# Patient Record
Sex: MALE | Race: BLACK OR AFRICAN AMERICAN | URBAN - METROPOLITAN AREA
[De-identification: names, ages, dates, MRNs, and addresses within clinical notes are randomized per-mention and may not be internally consistent; named-entity substitution may affect disease eponyms.]

---

## 2017-03-22 ENCOUNTER — OFFICE VISIT (OUTPATIENT)
Dept: FAMILY MEDICINE | Facility: CLINIC | Age: 62
End: 2017-03-22

## 2017-03-22 VITALS
RESPIRATION RATE: 18 BRPM | WEIGHT: 163 LBS | SYSTOLIC BLOOD PRESSURE: 203 MMHG | DIASTOLIC BLOOD PRESSURE: 107 MMHG | OXYGEN SATURATION: 97 % | HEART RATE: 69 BPM | TEMPERATURE: 97.4 F

## 2017-03-22 DIAGNOSIS — J45.31 MILD PERSISTENT ASTHMA WITH ACUTE EXACERBATION: ICD-10-CM

## 2017-03-22 DIAGNOSIS — I16.0 HYPERTENSIVE URGENCY: ICD-10-CM

## 2017-03-22 DIAGNOSIS — J43.8 OTHER EMPHYSEMA (H): Primary | ICD-10-CM

## 2017-03-22 LAB
ALBUMIN SERPL-MCNC: 4.5 MG/DL (ref 3.5–4.7)
ALP SERPL-CCNC: 65 U/L (ref 31.7–110.7)
ALT SERPL-CCNC: 16.1 U/L (ref 0–45)
AST SERPL-CCNC: 19.6 U/L (ref 0–55)
BILIRUB SERPL-MCNC: 0.5 MG/DL (ref 0.2–1.3)
BILIRUBIN DIRECT: 0.2 MG/DL (ref 0.1–0.3)
BUN SERPL-MCNC: 19.1 MG/DL (ref 7–21)
CALCIUM SERPL-MCNC: 9.9 MG/DL (ref 8.5–10.1)
CHLORIDE SERPLBLD-SCNC: 104.1 MMOL/L (ref 98–110)
CHOLEST SERPL-MCNC: 277.2 MG/DL (ref 0–200)
CHOLEST/HDLC SERPL: 5.8 {RATIO} (ref 0–5)
CO2 SERPL-SCNC: 27.8 MMOL/L (ref 20–32)
CREAT SERPL-MCNC: 0.9 MG/DL (ref 0.7–1.3)
GFR SERPL CREATININE-BSD FRML MDRD: >90 ML/MIN/1.7 M2
GLUCOSE SERPL-MCNC: 106.3 MG'DL (ref 70–99)
HBA1C MFR BLD: 5.3 % (ref 4.1–5.7)
HDLC SERPL-MCNC: 48 MG/DL
LDLC SERPL CALC-MCNC: 210 MG/DL (ref 0–129)
POTASSIUM SERPL-SCNC: 4.7 MMOL/DL (ref 3.3–4.5)
PROT SERPL-MCNC: 7 G/DL (ref 6.8–8.8)
SODIUM SERPL-SCNC: 139.9 MMOL/L (ref 132.6–141.4)
TRIGL SERPL-MCNC: 98.2 MG/DL (ref 0–150)
VLDL CHOLESTEROL: 19.6 MG/DL (ref 7–32)

## 2017-03-22 RX ORDER — TIOTROPIUM BROMIDE 18 UG/1
CAPSULE ORAL; RESPIRATORY (INHALATION)
Qty: 30 CAPSULE | Refills: 1 | Status: SHIPPED | OUTPATIENT
Start: 2017-03-22

## 2017-03-22 RX ORDER — ALBUTEROL SULFATE 90 UG/1
2 AEROSOL, METERED RESPIRATORY (INHALATION) EVERY 6 HOURS PRN
Qty: 1 INHALER | Refills: 3 | Status: SHIPPED | OUTPATIENT
Start: 2017-03-22 | End: 2017-03-22

## 2017-03-22 RX ORDER — AMLODIPINE BESYLATE 5 MG/1
5 TABLET ORAL DAILY
Qty: 30 TABLET | Refills: 1 | Status: SHIPPED | OUTPATIENT
Start: 2017-03-22 | End: 2017-03-30

## 2017-03-22 RX ORDER — ALBUTEROL SULFATE 90 UG/1
2 AEROSOL, METERED RESPIRATORY (INHALATION) EVERY 6 HOURS PRN
Qty: 1 INHALER | Refills: 3 | Status: SHIPPED | OUTPATIENT
Start: 2017-03-22

## 2017-03-22 RX ORDER — CHLORTHALIDONE 25 MG/1
25 TABLET ORAL DAILY
Qty: 90 TABLET | Refills: 3 | Status: SHIPPED | OUTPATIENT
Start: 2017-03-22 | End: 2017-03-30

## 2017-03-22 RX ORDER — TIOTROPIUM BROMIDE 18 UG/1
CAPSULE ORAL; RESPIRATORY (INHALATION)
Qty: 30 CAPSULE | Refills: 1 | Status: SHIPPED | OUTPATIENT
Start: 2017-03-22 | End: 2017-03-22

## 2017-03-22 NOTE — PROGRESS NOTES
HPI       Dana Meehan is a 62 year old  male  who presents to establish care as well as bp control. He states that he has been has been a smoker for 35 years. He currently works as a . No fevers or chills. No PURI. No SOB no orthopnea.  He denies chest pain. He is currently not on albuterol but says he has a history of asthma.     There are no active problems to display for this patient.      No current outpatient prescriptions on file.        No Known Allergies    Problem, Medication and Allergy Lists were reviewed and updated if needed..    Patient is an established patient of this clinic..         Review of Systems:   General: No distress  HEENT: No sore throat  Pulm: No shortness of breath, no cough  CVS: No chest pain, no palpitations  GI: No abdominal pain, nausea, vomiting or diarrhea   : No dysuria  MSK: No back pain  Skin: No new rashes  Neuro: No headache, no dizziness,               Physical Exam:     Vitals:    03/22/17 0859   BP: (!) 203/107   BP Location: Left arm   Patient Position: Chair   Cuff Size: Adult Regular   Pulse: 69   Resp: 18   Temp: 97.4  F (36.3  C)   TempSrc: Oral   SpO2: 97%   Weight: 163 lb (73.9 kg)     There is no height or weight on file to calculate BMI.    Constitutional: Awake, alert, cooperative, no apparent distress, and appears stated age  HEENT: MMM, no conjunctival pallor  Pulm: CTAB, No rales, No wheeze, no increased effort of breathing.  CVS: RRR, No murmurs   GI: S NT ND BS +ve  Hem/Onc: No cervical LN marisel  MSK: No pedal edema or calf tenderness.  Skin : no  rash  Neuro:  No focal neurological deficit  Psych : Good eye contact, well groomed, very interactive and collaborative. Good insight. Thought process is linear and coherent. Associations are tight. Mood is good. Affect euthymic.       EKG Interpretation:  By Jacquelyn Watson MD    Indication:Hypertension  Symptoms at time of EKG: None   Interpretation: appears normal, NSR, normal axis, normal  intervals, no acute ST/T changes c/w ischemia, no LVH by voltage criteria, unchanged from previous tracings  Comparison with previous EKGs:No change from previous    Patient informed at visit.    No results found for this or any previous visit (from the past 24 hour(s)).    Assessment and Plan      1. Hypertensive urgency- labs to rule out end organ failure. EKG without St/t changes. Has stopped bp medication for over 1 week. He does not know what medication he was on and has no idea his bp readings while on it. Will start on two meds today. Follow up within 1 week for BP check and then 2 weeks to check labs.     - Hepatic Panel ([facility])  - Basic Metabolic Panel ([facility])  - EKG 12-lead complete w/read - Clinics  - Lipid [place] ([facility])  - Hemoglobin A1c ([facility])  - amLODIPine (NORVASC) 5 MG tablet; Take 1 tablet (5 mg) by mouth daily  Dispense: 30 tablet; Refill: 1  - chlorthalidone (HYGROTON) 25 MG tablet; Take 1 tablet (25 mg) by mouth daily  Dispense: 90 tablet; Refill: 3    2. Other emphysema. - per patient he has hx of asthma.  no pfts in chart. Patient not interested in getting any done due to schedule. Will start on Spiriva and albuterol prn for now.   - albuterol (PROAIR HFA/PROVENTIL HFA/VENTOLIN HFA) 108 (90 BASE) MCG/ACT Inhaler; Inhale 2 puffs into the lungs every 6 hours as needed for shortness of breath / dyspnea or wheezing  Dispense: 1 Inhaler; Refill: 3  - tiotropium (SPIRIVA HANDIHALER) 18 MCG capsule; Inhale contents of one capsule daily.  Dispense: 30 capsule; Refill: 1    There are no discontinued medications.    Options for treatment and follow-up care were reviewed with the patient. Dana Meehan  engaged in the decision making process and verbalized understanding of the options discussed and agreed with the final plan.    Jacquelyn Watson MD G3  [facility]  Family Medicine Resident  Pager [phone]              .

## 2017-03-22 NOTE — PROGRESS NOTES
Preceptor Attestation:   Patient seen and discussed with the resident. I personally viewed the EKG and agree with the interpretation documented by the resident. Assessment and plan reviewed with resident and agreed upon.   Supervising Physician:  Tammy Engle MD  [facility]

## 2017-03-22 NOTE — MR AVS SNAPSHOT
After Visit Summary   3/22/2017    Dana Meehan    MRN: [id number]           Patient Information     Date Of Birth          1955        Visit Information        Provider Department      3/22/2017 8:40 AM Jacquelyn Watson MD [facility]        Today's Diagnoses     Hypertensive urgency    -  1    Mild persistent asthma with acute exacerbation        Other emphysema (H)          Care Instructions    Here is the plan from today's visit    1. Hypertensive urgency    - Hepatic Panel ([facility])  - Basic Metabolic Panel ([facility])  - EKG 12-lead complete w/read - Clinics  - Lipid [place] ([facility])  - Hemoglobin A1c ([facility])  - Albumin Random Urine Quantitative  - amLODIPine (NORVASC) 5 MG tablet; Take 1 tablet (5 mg) by mouth daily  Dispense: 30 tablet; Refill: 1  - chlorthalidone (HYGROTON) 25 MG tablet; Take 1 tablet (25 mg) by mouth daily  Dispense: 90 tablet; Refill: 3    2. Mild persistent asthma with acute exacerbation    - albuterol (PROAIR HFA/PROVENTIL HFA/VENTOLIN HFA) 108 (90 BASE) MCG/ACT Inhaler; Inhale 2 puffs into the lungs every 6 hours as needed for shortness of breath / dyspnea or wheezing  Dispense: 1 Inhaler; Refill: 3    3. Other emphysema (H)    - tiotropium (SPIRIVA HANDIHALER) 18 MCG capsule; Inhale contents of one capsule daily.  Dispense: 30 capsule; Refill: 1      Follow up in 2-3 days , Friday or Monday  Check bp in pharmacies, if does not go down less than 200 , go to hospital    Thank you for coming to [facility] today.  Lab Testing:  **If you had lab testing today and your results are reassuring or normal they will be mailed to you or sent through [place] within 7 days.   **If the lab tests need quick action we will call you with the results.  The phone number we will call with results is # [phone] (home) . If this is not the best number please call our clinic and change the number.  Medication Refills:  If you need any refills please call  your pharmacy and they will contact us.   If you need to  your refill at a new pharmacy, please contact the new pharmacy directly. The new pharmacy will help you get your medications transferred faster.   Scheduling:  If you have any concerns about today's visit or wish to schedule another appointment please call our office during normal business hours [phone] (8-5:00 M-F)  If a referral was made to a [facility] and you don't get a call from central scheduling please call [phone].  If a Mammogram was ordered for you at [facility] call [phone] to schedule or change your appointment.  If you had an XRay/CT/Ultrasound/MRI ordered the number is [phone] to schedule or change your radiology appointment.   Medical Concerns:  If you have urgent medical concerns please call [phone] at any time of the day.          Follow-ups after your visit        Who to contact     Please call your clinic at [phone] to:    Ask questions about your health    Make or cancel appointments    Discuss your medicines    Learn about your test results    Speak to your doctor   If you have compliments or concerns about an experience at your clinic, or if you wish to file a complaint, please contact [facility] Patient Relations at [phone] or email us at [email or web address]         Additional Information About Your Visit        MyChart Information     [employer] is an electronic gateway that provides easy, online access to your medical records. With [employer], you can request a clinic appointment, read your test results, renew a prescription or communicate with your care team.     To sign up for [employer] visit the website at [email or web address]   You will be asked to enter the access code listed below, as well as some personal information. Please follow the directions to create your username and password.     Your access code is:  [email or web address]  Expires: 2017 10:16 AM     Your access code will  in 90 days. If you need help or a new code, please contact your [facility] or call [phone] for assistance.        Care EveryWhere ID     This is your Care EveryWhere ID. This could be used by other organizations to access your [facility] medical records  [id number]        Your Vitals Were     Pulse Temperature Respirations Pulse Oximetry          69 97.4  F (36.3  C) (Oral) 18 97%         Blood Pressure from Last 3 Encounters:   17 (!) 203/107    Weight from Last 3 Encounters:   17 163 lb (73.9 kg)              We Performed the Following     Albumin Random Urine Quantitative     Basic Metabolic Panel ([facility])     EKG 12-lead complete w/read - Clinics     Hemoglobin A1c ([facility])     Hepatic Panel ([facility])     Lipid [place] ([facility])          Today's Medication Changes          These changes are accurate as of: 3/22/17 10:16 AM.  If you have any questions, ask your nurse or doctor.               Start taking these medicines.        Dose/Directions    albuterol 108 (90 BASE) MCG/ACT Inhaler   Commonly known as:  PROAIR HFA/PROVENTIL HFA/VENTOLIN HFA   Used for:  Mild persistent asthma with acute exacerbation   Started by:  Jacquelyn Watson MD        Dose:  2 puff   Inhale 2 puffs into the lungs every 6 hours as needed for shortness of breath / dyspnea or wheezing   Quantity:  1 Inhaler   Refills:  3       amLODIPine 5 MG tablet   Commonly known as:  NORVASC   Used for:  Hypertensive urgency   Started by:  Jacquelyn Watson MD        Dose:  5 mg   Take 1 tablet (5 mg) by mouth daily   Quantity:  30 tablet   Refills:  1       chlorthalidone 25 MG tablet   Commonly known as:  HYGROTON   Used for:  Hypertensive urgency   Started by:  Jacquelyn Watson MD        Dose:  25 mg   Take 1 tablet (25 mg) by mouth daily   Quantity:  90 tablet   Refills:  3       tiotropium 18 MCG capsule   Commonly  known as:  SPIRIVA HANDIHALER   Used for:  Other emphysema (H)   Started by:  Jacquelyn Watson MD        Inhale contents of one capsule daily.   Quantity:  30 capsule   Refills:  1            Where to get your medicines      These medications were sent to [facility] [place], [place] - [address] [facility] [address]     Phone:  [phone]     albuterol 108 (90 BASE) MCG/ACT Inhaler    amLODIPine 5 MG tablet    chlorthalidone 25 MG tablet    tiotropium 18 MCG capsule                Primary Care Provider Office Phone # Fax #    Jacquelyn Watson [phone]       [facility] [address]  [facility] [address]        Thank you!     Thank you for choosing [facility]  for your care. Our goal is always to provide you with excellent care. Hearing back from our patients is one way we can continue to improve our services. Please take a few minutes to complete the written survey that you may receive in the mail after your visit with us. Thank you!             Your Updated Medication List - Protect others around you: Learn how to safely use, store and throw away your medicines at www.disposemymeds.org.          This list is accurate as of: 3/22/17 10:16 AM.  Always use your most recent med list.                   Brand Name Dispense Instructions for use    albuterol 108 (90 BASE) MCG/ACT Inhaler    PROAIR HFA/PROVENTIL HFA/VENTOLIN HFA    1 Inhaler    Inhale 2 puffs into the lungs every 6 hours as needed for shortness of breath / dyspnea or wheezing       amLODIPine 5 MG tablet    NORVASC    30 tablet    Take 1 tablet (5 mg) by mouth daily       chlorthalidone 25 MG tablet    HYGROTON    90 tablet    Take 1 tablet (25 mg) by mouth daily       tiotropium 18 MCG capsule    SPIRIVA HANDIHALER    30 capsule    Inhale contents of one capsule daily.

## 2017-03-22 NOTE — PATIENT INSTRUCTIONS
Here is the plan from today's visit    1. Hypertensive urgency    - Hepatic Panel ([facility])  - Basic Metabolic Panel ([facility])  - EKG 12-lead complete w/read - Clinics  - Lipid [place] ([facility])  - Hemoglobin A1c ([facility])  - Albumin Random Urine Quantitative  - amLODIPine (NORVASC) 5 MG tablet; Take 1 tablet (5 mg) by mouth daily  Dispense: 30 tablet; Refill: 1  - chlorthalidone (HYGROTON) 25 MG tablet; Take 1 tablet (25 mg) by mouth daily  Dispense: 90 tablet; Refill: 3    2. Mild persistent asthma with acute exacerbation    - albuterol (PROAIR HFA/PROVENTIL HFA/VENTOLIN HFA) 108 (90 BASE) MCG/ACT Inhaler; Inhale 2 puffs into the lungs every 6 hours as needed for shortness of breath / dyspnea or wheezing  Dispense: 1 Inhaler; Refill: 3    3. Other emphysema (H)    - tiotropium (SPIRIVA HANDIHALER) 18 MCG capsule; Inhale contents of one capsule daily.  Dispense: 30 capsule; Refill: 1      Follow up in 2-3 days , Friday or Monday  Check bp in pharmacies, if does not go down less than 200 , go to hospital    Thank you for coming to [facility] today.  Lab Testing:  **If you had lab testing today and your results are reassuring or normal they will be mailed to you or sent through [employer] within 7 days.   **If the lab tests need quick action we will call you with the results.  The phone number we will call with results is # [phone] (home) . If this is not the best number please call our clinic and change the number.  Medication Refills:  If you need any refills please call your pharmacy and they will contact us.   If you need to  your refill at a new pharmacy, please contact the new pharmacy directly. The new pharmacy will help you get your medications transferred faster.   Scheduling:  If you have any concerns about today's visit or wish to schedule another appointment please call our office during normal business hours [phone] (8-5:00 M-F)  If a referral was made to a [facility] and you don't get a call from central scheduling please call [phone].  If a Mammogram was ordered for you at [facility] call [phone] to schedule or change your appointment.  If you had an XRay/CT/Ultrasound/MRI ordered the number is [phone] to schedule or change your radiology appointment.   Medical Concerns:  If you have urgent medical concerns please call [phone] at any time of the day.

## 2017-03-22 NOTE — Clinical Note
3/22/2017      RE: Dana Meehan  [address] [address]              HPI       Dana Meehan is a 62 year old male who presents to establish care as well as bp control. He states that he has been has been a smoker for 35 years. He currently works as a . No fevers or chills. No PURI. No SOB no orthopnea. He denies chest pain. He is currently not on albuterol but says he has a history of asthma.         No Known Allergies     Problem, Medication and Allergy Lists were reviewed and updated if needed..     Patient is an established patient of this clinic..         Review of Systems:    General: No distress  HEENT: No sore throat  Pulm: No shortness of breath, no cough  CVS: No chest pain, no palpitations  GI: No abdominal pain, nausea, vomiting or diarrhea   : No dysuria  MSK: No back pain  Skin: No new rashes  Neuro: No headache, no dizziness,        Physical Exam:       Vitals        Vitals:     03/22/17 0859   BP: (!) 203/107   BP Location: Left arm   Patient Position: Chair   Cuff Size: Adult Regular   Pulse: 69   Resp: 18   Temp: 97.4  F (36.3  C)   TempSrc: Oral   SpO2: 97%   Weight: 163 lb (73.9 kg)         There is no height or weight on file to calculate BMI.     Constitutional: Awake, alert, cooperative, no apparent distress, and appears stated age  HEENT: MMM, no conjunctival pallor  Pulm: CTAB, No rales, No wheeze, no increased effort of breathing.  CVS: RRR, No murmurs   GI: S NT ND BS +ve  Hem/Onc: No cervical LN marisel  MSK: No pedal edema or calf tenderness.  Skin : no rash  Neuro: No focal neurological deficit  Psych : Good eye contact, well groomed, very interactive and collaborative. Good insight. Thought process is linear and coherent. Associations are tight. Mood is good. Affect euthymic.         EKG Interpretation:  By Jacquelyn Watson MD     Indication:Hypertension  Symptoms at time of EKG: None   Interpretation: appears normal, NSR, normal axis, normal intervals,  no acute ST/T changes c/w ischemia, no LVH by voltage criteria, unchanged from previous tracings  Comparison with previous EKGs:No change from previous     Patient informed at visit.     No results found for this or any previous visit (from the past 24 hour(s)).     Assessment and Plan       1. Hypertensive urgency- labs to rule out end organ failure. EKG without St/t changes. Has stopped bp medication for over 1 week. He does not know what medication he was on and has no idea his bp readings while on it. Will start on two meds today. Follow up within 1 week for BP check and then 2 weeks to check labs.      - Hepatic Panel ([facility])  - Basic Metabolic Panel ([facility])  - EKG 12-lead complete w/read - Clinics  - Lipid [place] ([facility])  - Hemoglobin A1c ([facility])  - amLODIPine ([facility]ASC) 5 MG tablet; Take 1 tablet (5 mg) by mouth daily Dispense: 30 tablet; Refill: 1  - chlorthalidone (HYGROTON) 25 MG tablet; Take 1 tablet (25 mg) by mouth daily Dispense: 90 tablet; Refill: 3     2. Other emphysema. - per patient he has hx of asthma.  no pfts in chart. Patient not interested in getting any done due to schedule. Will start on Spiriva and albuterol prn for now.   - albuterol (PROAIR HFA/PROVENTIL HFA/VENTOLIN HFA) 108 (90 BASE) MCG/ACT Inhaler; Inhale 2 puffs into the lungs every 6 hours as needed for shortness of breath / dyspnea or wheezing Dispense: 1 Inhaler; Refill: 3  - tiotropium (SPIRIVA HANDIHALER) 18 MCG capsule; Inhale contents of one capsule daily. Dispense: 30 capsule; Refill: 1     There are no discontinued medications.     Options for treatment and follow-up care were reviewed with the patient. Dana Meehan engaged in the decision making process and verbalized understanding of the options discussed and agreed with the final plan.     Jacquelyn Watson MD G3  [facility]  Family Medicine Resident  Pager [phone]                   HPI       aDna Meehan is a 62 year old male  who presents to establish care as well as bp control. He states that he has been has been a smoker for 35 years. He currently works as a . No fevers or chills. No PURI. No SOB no orthopnea. He denies chest pain. He is currently not on albuterol but says he has a history of asthma.         No Known Allergies     Problem, Medication and Allergy Lists were reviewed and updated if needed..     Patient is an established patient of this clinic..         Review of Systems:    General: No distress  HEENT: No sore throat  Pulm: No shortness of breath, no cough  CVS: No chest pain, no palpitations  GI: No abdominal pain, nausea, vomiting or diarrhea   : No dysuria  MSK: No back pain  Skin: No new rashes  Neuro: No headache, no dizziness,        Physical Exam:       Vitals        Vitals:     03/22/17 0859   BP: (!) 203/107   BP Location: Left arm   Patient Position: Chair   Cuff Size: Adult Regular   Pulse: 69   Resp: 18   Temp: 97.4  F (36.3  C)   TempSrc: Oral   SpO2: 97%   Weight: 163 lb (73.9 kg)         There is no height or weight on file to calculate BMI.     Constitutional: Awake, alert, cooperative, no apparent distress, and appears stated age  HEENT: MMM, no conjunctival pallor  Pulm: CTAB, No rales, No wheeze, no increased effort of breathing.  CVS: RRR, No murmurs   GI: S NT ND BS +ve  Hem/Onc: No cervical LN marsiel  MSK: No pedal edema or calf tenderness.  Skin : no rash  Neuro: No focal neurological deficit  Psych : Good eye contact, well groomed, very interactive and collaborative. Good insight. Thought process is linear and coherent. Associations are tight. Mood is good. Affect euthymic.         EKG Interpretation:  By Jacquelyn Watson MD     Indication:Hypertension  Symptoms at time of EKG: None   Interpretation: appears normal, NSR, normal axis, normal intervals, no acute ST/T changes c/w ischemia, no LVH by voltage criteria, unchanged from previous tracings  Comparison with previous EKGs:No  change from previous     Patient informed at visit.     No results found for this or any previous visit (from the past 24 hour(s)).     Assessment and Plan       1. Hypertensive urgency- labs to rule out end organ failure. EKG without St/t changes. Has stopped bp medication for over 1 week. He does not know what medication he was on and has no idea his bp readings while on it. Will start on two meds today. Follow up within 1 week for BP check and then 2 weeks to check labs.      - Hepatic Panel ([facility])  - Basic Metabolic Panel ([facility])  - EKG 12-lead complete w/read - Clinics  - Lipid [place] ([facility])  - Hemoglobin A1c ([facility])  - amLODIPine (NORVASC) 5 MG tablet; Take 1 tablet (5 mg) by mouth daily Dispense: 30 tablet; Refill: 1  - chlorthalidone (HYGROTON) 25 MG tablet; Take 1 tablet (25 mg) by mouth daily Dispense: 90 tablet; Refill: 3     2. Other emphysema. - per patient he has hx of asthma.  no pfts in chart. Patient not interested in getting any done due to schedule. Will start on Spiriva and albuterol prn for now.   - albuterol (PROAIR HFA/PROVENTIL HFA/VENTOLIN HFA) 108 (90 BASE) MCG/ACT Inhaler; Inhale 2 puffs into the lungs every 6 hours as needed for shortness of breath / dyspnea or wheezing Dispense: 1 Inhaler; Refill: 3  - tiotropium (SPIRIVA HANDIHALER) 18 MCG capsule; Inhale contents of one capsule daily. Dispense: 30 capsule; Refill: 1     There are no discontinued medications.     Options for treatment and follow-up care were reviewed with the patient. Dana Meehan engaged in the decision making process and verbalized understanding of the options discussed and agreed with the final plan.     Jacquelyn Watson MD G3  [facility]  Family Medicine Resident  Pager [phone]            Preceptor Attestation:   Patient seen and discussed with the resident. Assessment and plan reviewed with resident and agreed upon.   Supervising Physician:  Tammy Engle  MD [facility]      Jacquelyn Watson MD

## 2017-03-22 NOTE — Clinical Note
Siomara - I can't close this encounter and can't figure out why it says Jacquelyn has an incomplete note?  Tammy

## 2017-03-23 ENCOUNTER — TELEPHONE (OUTPATIENT)
Dept: FAMILY MEDICINE | Facility: CLINIC | Age: 62
End: 2017-03-23

## 2017-03-23 DIAGNOSIS — J41.0 SIMPLE CHRONIC BRONCHITIS (H): Primary | ICD-10-CM

## 2017-03-23 NOTE — TELEPHONE ENCOUNTER
PLEASE RESEND NEW RX FOR INCRUSE ELLIPTA,     SPIRIVA IS NONFORMULARY. OTHER WISE SUBMIT PA USING INFORMATION BELOW    PRIOR AUTHORIZATION FOR   Drug: SPIRIVA  Insurance: [facility] [facility]  ID Number: [id number]  BIN Number: [id number]  PCN Number: [id number]  Group Number: [id number]  Phone Number: [phone]    Please notify pharmacy if Prior Auth is approved or denied      THANKS  Yoanna Cristina CPhT   [facility]   Phone [phone]

## 2017-03-24 ENCOUNTER — TELEPHONE (OUTPATIENT)
Dept: FAMILY MEDICINE | Facility: CLINIC | Age: 62
End: 2017-03-24

## 2017-03-24 NOTE — TELEPHONE ENCOUNTER
PA started 03/24/2017 tiotropium (SPIRIVA HANDIHALER) 18 MCG capsule    covermymeds crowell: [id number]    Ting Guzman CMA

## 2017-03-27 ENCOUNTER — OFFICE VISIT (OUTPATIENT)
Dept: OPHTHALMOLOGY | Facility: CLINIC | Age: 62
End: 2017-03-27
Attending: OPHTHALMOLOGY
Payer: COMMERCIAL

## 2017-03-27 DIAGNOSIS — H52.4 MYOPIA WITH ASTIGMATISM AND PRESBYOPIA, BILATERAL: Primary | ICD-10-CM

## 2017-03-27 DIAGNOSIS — H25.13 NUCLEAR SCLEROTIC CATARACT, BILATERAL: ICD-10-CM

## 2017-03-27 DIAGNOSIS — H52.13 MYOPIA WITH ASTIGMATISM AND PRESBYOPIA, BILATERAL: Primary | ICD-10-CM

## 2017-03-27 DIAGNOSIS — H52.203 MYOPIA WITH ASTIGMATISM AND PRESBYOPIA, BILATERAL: Primary | ICD-10-CM

## 2017-03-27 PROBLEM — I10 BENIGN ESSENTIAL HYPERTENSION: Status: ACTIVE | Noted: 2017-03-27

## 2017-03-27 PROBLEM — I16.0 HYPERTENSIVE URGENCY: Status: ACTIVE | Noted: 2017-03-27

## 2017-03-27 PROCEDURE — 99213 OFFICE O/P EST LOW 20 MIN: CPT | Mod: ZF

## 2017-03-27 PROCEDURE — 92015 DETERMINE REFRACTIVE STATE: CPT | Mod: ZF

## 2017-03-27 ASSESSMENT — REFRACTION_MANIFEST
OS_CYLINDER: +0.50
OS_AXIS: 090
OD_CYLINDER: +0.50
OD_AXIS: 045
OS_ADD: +2.50
OD_ADD: +2.50
OS_SPHERE: -0.20
OD_SPHERE: -0.75

## 2017-03-27 ASSESSMENT — CONF VISUAL FIELD
OS_NORMAL: 1
OD_NORMAL: 1

## 2017-03-27 ASSESSMENT — CUP TO DISC RATIO
OD_RATIO: 0.25
OS_RATIO: 0.25

## 2017-03-27 ASSESSMENT — VISUAL ACUITY
OD_SC+: -1
OS_SC+: +2
METHOD: SNELLEN - LINEAR
OD_SC: 20/20
OS_SC: 20/40

## 2017-03-27 ASSESSMENT — TONOMETRY
OD_IOP_MMHG: 14
OS_IOP_MMHG: 14
IOP_METHOD: TONOPEN

## 2017-03-27 ASSESSMENT — SLIT LAMP EXAM - LIDS
COMMENTS: NORMAL
COMMENTS: NORMAL

## 2017-03-27 NOTE — NURSING NOTE
"Chief Complaints and History of Present Illnesses   Patient presents with     COMPREHENSIVE EYE EXAM     Needs new glasses MRx.     HPI    Affected eye(s):  Both   Symptoms:     Dryness         Do you have eye pain now?:  No      Comments:  Pt states he feels a \"warmth\" in eyes.\"  Pt also states eyes feel fatigued after reading for a while.  Marti Patterson COA 2:07 PM March 27, 2017                "

## 2017-03-27 NOTE — MR AVS SNAPSHOT
After Visit Summary   3/27/2017    Dana Meehan    MRN: [id number]           Patient Information     Date Of Birth          1955        Visit Information        Provider Department      3/27/2017 1:30 PM Kevyn Retana MD; [facility] Eye Clinic        Today's Diagnoses     Myopia with astigmatism and presbyopia, bilateral    -  1    Nuclear sclerotic cataract, bilateral           Follow-ups after your visit        Follow-up notes from your care team     Return in about 1 year (around 3/27/2018) for complete eye exam .      Who to contact     Please call your clinic at [phone] to:    Ask questions about your health    Make or cancel appointments    Discuss your medicines    Learn about your test results    Speak to your doctor   If you have compliments or concerns about an experience at your clinic, or if you wish to file a complaint, please contact [facility] Patient Relations at [phone] or email us at [email or web address]         Additional Information About Your Visit        MyChart Information     microDimensionst gives you secure access to your electronic health record. If you see a primary care provider, you can also send messages to your care team and make appointments. If you have questions, please call your primary care clinic.  If you do not have a primary care provider, please call [phone] and they will assist you.      Praccel is an electronic gateway that provides easy, online access to your medical records. With Praccel, you can request a clinic appointment, read your test results, renew a prescription or communicate with your care team.     To access your existing account, please contact your [facility] or call [phone] for assistance.        Care EveryWhere ID     This is your Care EveryWhere ID. This could be used by other organizations to access your [facility]  records  [id number]         Blood Pressure from Last 3 Encounters:   03/30/17 174/89   03/22/17 (!) 203/107    Weight from Last 3 Encounters:   03/30/17 74.2 kg (163 lb 9.6 oz)   03/22/17 73.9 kg (163 lb)              Today, you had the following     No orders found for display       Primary Care Provider Office Phone # Fax #    Jacquelyn Watson [phone]       [facility] [address] [facility] [address]        Thank you!     Thank you for choosing EYE CLINIC  for your care. Our goal is always to provide you with excellent care. Hearing back from our patients is one way we can continue to improve our services. Please take a few minutes to complete the written survey that you may receive in the mail after your visit with us. Thank you!             Your Updated Medication List - Protect others around you: Learn how to safely use, store and throw away your medicines at www.disposemymeds.org.          This list is accurate as of: 3/27/17 11:59 PM.  Always use your most recent med list.                   Brand Name Dispense Instructions for use    albuterol 108 (90 BASE) MCG/ACT Inhaler    PROAIR HFA/PROVENTIL HFA/VENTOLIN HFA    1 Inhaler    Inhale 2 puffs into the lungs every 6 hours as needed for shortness of breath / dyspnea or wheezing       tiotropium 18 MCG capsule    SPIRIVA HANDIHALER    30 capsule    Inhale contents of one capsule daily.

## 2017-03-27 NOTE — PROGRESS NOTES
CC:  Patient presents with:  COMPREHENSIVE EYE EXAM: Needs new glasses MRx.      HPI:   Dana Meehan is a 62 year old year-old patient who presents for complete eye exam. He notes difficulty with reading and gets eye strain after a period of time. Would like new glasses rx today. Also notes vague sx of warmth in his eyes.     POH:   none    Current Ocular Meds:  None    ASSESSMENT & PLAN:    Dana Meehan is a 62 year old male with the following diagnoses:     1. Myopia with astigmatism and presbyopia OU   New glasses rx given     2. Cataracts OU   Mild, not visually significant   Observe    RTC: 1 year for annual exam     Kevyn Retana MD MPH   Ophthalmology Resident PGY-3      Teaching statement:  Complete documentation of historical and exam elements from today's encounter can be found in the full encounter summary report (not reduplicated in this progress note). I personally obtained the chief complaint(s) and history of present illness.  I confirmed and edited as necessary the review of systems, past medical/surgical history, family history, social history, and examination findings as documented by others; and I examined the patient myself. I personally reviewed the relevant tests, images, and reports as documented above.     I formulated and edited as necessary the assessment and plan and discussed the findings and management plan with the patient and family.    Brandy Coto MD  Comprehensive Ophthalmology & Ocular Pathology  Department of Ophthalmology and Visual Neurosciences  [email or web address]  Pager [phone]

## 2017-03-30 ENCOUNTER — OFFICE VISIT (OUTPATIENT)
Dept: FAMILY MEDICINE | Facility: CLINIC | Age: 62
End: 2017-03-30

## 2017-03-30 VITALS
SYSTOLIC BLOOD PRESSURE: 174 MMHG | DIASTOLIC BLOOD PRESSURE: 89 MMHG | OXYGEN SATURATION: 98 % | TEMPERATURE: 97.6 F | WEIGHT: 163.6 LBS | HEART RATE: 71 BPM | RESPIRATION RATE: 18 BRPM

## 2017-03-30 DIAGNOSIS — K04.7 TOOTH ABSCESS: ICD-10-CM

## 2017-03-30 DIAGNOSIS — Z91.89 AT RISK FOR HEART DISEASE: ICD-10-CM

## 2017-03-30 DIAGNOSIS — I10 BENIGN ESSENTIAL HYPERTENSION: Primary | ICD-10-CM

## 2017-03-30 DIAGNOSIS — J41.0 SIMPLE CHRONIC BRONCHITIS (H): ICD-10-CM

## 2017-03-30 DIAGNOSIS — I16.0 HYPERTENSIVE URGENCY: ICD-10-CM

## 2017-03-30 DIAGNOSIS — Z00.00 HEALTH CARE MAINTENANCE: ICD-10-CM

## 2017-03-30 LAB
BUN SERPL-MCNC: 13.3 MG/DL (ref 7–21)
CALCIUM SERPL-MCNC: 10.1 MG/DL (ref 8.5–10.1)
CHLORIDE SERPLBLD-SCNC: 99.4 MMOL/L (ref 98–110)
CO2 SERPL-SCNC: 34.7 MMOL/L (ref 20–32)
CREAT SERPL-MCNC: 0.8 MG/DL (ref 0.7–1.3)
GFR SERPL CREATININE-BSD FRML MDRD: >90 ML/MIN/1.7 M2
GLUCOSE SERPL-MCNC: 115.3 MG'DL (ref 70–99)
POTASSIUM SERPL-SCNC: 4.2 MMOL/DL (ref 3.3–4.5)
SODIUM SERPL-SCNC: 139.3 MMOL/L (ref 132.6–141.4)

## 2017-03-30 RX ORDER — CHLORTHALIDONE 25 MG/1
50 TABLET ORAL DAILY
Qty: 90 TABLET | Refills: 3 | Status: SHIPPED | OUTPATIENT
Start: 2017-03-30

## 2017-03-30 RX ORDER — AMLODIPINE BESYLATE 10 MG/1
10 TABLET ORAL DAILY
Qty: 60 TABLET | Refills: 3 | Status: SHIPPED | OUTPATIENT
Start: 2017-03-30

## 2017-03-30 RX ORDER — CHOLECALCIFEROL (VITAMIN D3) 50 MCG
2000 TABLET ORAL DAILY
Qty: 100 TABLET | Refills: 3 | Status: SHIPPED | OUTPATIENT
Start: 2017-03-30

## 2017-03-30 RX ORDER — ATORVASTATIN CALCIUM 40 MG/1
40 TABLET, FILM COATED ORAL DAILY
Qty: 90 TABLET | Refills: 3 | Status: SHIPPED | OUTPATIENT
Start: 2017-03-30

## 2017-03-30 NOTE — NURSING NOTE
name: Chanelle  Language: Jordanian  Agency: [employer]  Phone number: [phone]  Mayra Flores CMA

## 2017-03-30 NOTE — MR AVS SNAPSHOT
After Visit Summary   3/30/2017    Dana Meehan    MRN: [id number]           Patient Information     Date Of Birth          1955        Visit Information        Provider Department      3/30/2017 8:40 AM Jacquelyn Watson MD [facility]        Today's Diagnoses     Benign essential hypertension    -  1    Hypertensive urgency        At risk for heart disease        Health care maintenance        Tooth abscess        Simple chronic bronchitis (H)           Follow-ups after your visit        Who to contact     Please call your clinic at [phone] to:    Ask questions about your health    Make or cancel appointments    Discuss your medicines    Learn about your test results    Speak to your doctor   If you have compliments or concerns about an experience at your clinic, or if you wish to file a complaint, please contact [facility] Patient Relations at [phone] or email us at [email or web address]         Additional Information About Your Visit        MyChart Information     ki workt gives you secure access to your electronic health record. If you see a primary care provider, you can also send messages to your care team and make appointments. If you have questions, please call your primary care clinic.  If you do not have a primary care provider, please call [phone] and they will assist you.      Chegg is an electronic gateway that provides easy, online access to your medical records. With Chegg, you can request a clinic appointment, read your test results, renew a prescription or communicate with your care team.     To access your existing account, please contact your [facility] or call [phone] for assistance.        Care EveryWhere ID     This is your Care EveryWhere ID. This could be used by other organizations to access your [place] medical records  [id number]        Your Vitals Were      Pulse Temperature Respirations Pulse Oximetry          71 97.6  F (36.4  C) (Oral) 18 98%         Blood Pressure from Last 3 Encounters:   03/30/17 174/89   03/22/17 (!) 203/107    Weight from Last 3 Encounters:   03/30/17 74.2 kg (163 lb 9.6 oz)   03/22/17 73.9 kg (163 lb)              We Performed the Following     Basic Metabolic Panel ([facility])          Today's Medication Changes          These changes are accurate as of: 3/30/17 11:59 PM.  If you have any questions, ask your nurse or doctor.               Start taking these medicines.        Dose/Directions    amoxicillin-clavulanate 875-125 MG per tablet   Commonly known as:  AUGMENTIN   Used for:  Tooth abscess   Started by:  Jacquelyn Watson MD        Dose:  1 tablet   Take 1 tablet by mouth 2 times daily   Quantity:  20 tablet   Refills:  0       atorvastatin 40 MG tablet   Commonly known as:  LIPITOR   Used for:  At risk for heart disease   Started by:  Jacquelyn Watson MD        Dose:  40 mg   Take 1 tablet (40 mg) by mouth daily   Quantity:  90 tablet   Refills:  3       order for DME   Used for:  Benign essential hypertension   Started by:  Jacquelyn Watson MD        Equipment being ordered: automated bp cuff   Quantity:  1 Box   Refills:  1       vitamin D 2000 UNITS tablet   Used for:  Health care maintenance   Started by:  Jacquelyn Watson MD        Dose:  2000 Units   Take 2,000 Units by mouth daily   Quantity:  100 tablet   Refills:  3         These medicines have changed or have updated prescriptions.        Dose/Directions    amLODIPine 10 MG tablet   Commonly known as:  NORVASC   This may have changed:    - medication strength  - how much to take   Used for:  Hypertensive urgency   Changed by:  Jacquelyn Watson MD        Dose:  10 mg   Take 1 tablet (10 mg) by mouth daily   Quantity:  60 tablet   Refills:  3       chlorthalidone 25 MG tablet   Commonly known as:  HYGROTON   This may have changed:  how much to take   Used for:  Hypertensive  urgency   Changed by:  Jacquelyn Watson MD        Dose:  50 mg   Take 2 tablets (50 mg) by mouth daily   Quantity:  90 tablet   Refills:  3            Where to get your medicines      These medications were sent to [facility] - [place], [place] - [address]  [address] [address]     Phone:  [phone]     umeclidinium 62.5 MCG/INH oral inhaler         These medications were sent to [place], [place] - [address] [facility] [address]     Phone:  [phone]     amLODIPine 10 MG tablet    amoxicillin-clavulanate 875-125 MG per tablet    atorvastatin 40 MG tablet    chlorthalidone 25 MG tablet    vitamin D 2000 UNITS tablet         Some of these will need a paper prescription and others can be bought over the counter.  Ask your nurse if you have questions.     Bring a paper prescription for each of these medications     order for DME                Primary Care Provider Office Phone # Fax #    Jacquelyn Watson [phone]       [facility] [address]  [facility] [address]        Thank you!     Thank you for choosing [facility]  for your care. Our goal is always to provide you with excellent care. Hearing back from our patients is one way we can continue to improve our services. Please take a few minutes to complete the written survey that you may receive in the mail after your visit with us. Thank you!             Your Updated Medication List - Protect others around you: Learn how to safely use, store and throw away your medicines at www.disposemymeds.org.          This list is accurate as of: 3/30/17 11:59 PM.  Always use your most recent med list.                   Brand Name Dispense Instructions for use    albuterol 108 (90 BASE) MCG/ACT Inhaler    PROAIR HFA/PROVENTIL HFA/VENTOLIN HFA    1 Inhaler    Inhale 2 puffs into the lungs every 6 hours as needed for shortness of breath /  dyspnea or wheezing       amLODIPine 10 MG tablet    NORVASC    60 tablet    Take 1 tablet (10 mg) by mouth daily       amoxicillin-clavulanate 875-125 MG per tablet    AUGMENTIN    20 tablet    Take 1 tablet by mouth 2 times daily       atorvastatin 40 MG tablet    LIPITOR    90 tablet    Take 1 tablet (40 mg) by mouth daily       chlorthalidone 25 MG tablet    HYGROTON    90 tablet    Take 2 tablets (50 mg) by mouth daily       order for DME     1 Box    Equipment being ordered: automated bp cuff       tiotropium 18 MCG capsule    SPIRIVA HANDIHALER    30 capsule    Inhale contents of one capsule daily.       umeclidinium 62.5 MCG/INH oral inhaler    INCRUSE ELLIPTA    1 Inhaler    Inhale 1 puff into the lungs daily       vitamin D 2000 UNITS tablet     100 tablet    Take 2,000 Units by mouth daily

## 2017-03-30 NOTE — PROGRESS NOTES
HPI       Dana Meehan is a 62 year old  male  who presents for blood pressure follow up. He states he has been taking his bp medications regularly since his last visit. He denies any chest pain or sob.     He endorses tooth pain and feels that he has a tooth infection for the past few days. He has no fevers or chills and has not been to a dentist.      was used    Patient Active Problem List    Diagnosis Date Noted     Benign essential hypertension 03/27/2017     Priority: Medium     Hypertensive urgency 03/27/2017     Priority: Medium       Current Outpatient Prescriptions   Medication Sig Dispense Refill     umeclidinium (INCRUSE ELLIPTA) 62.5 MCG/INH oral inhaler Inhale 1 puff into the lungs daily 1 Inhaler 3     amLODIPine (NORVASC) 5 MG tablet Take 1 tablet (5 mg) by mouth daily 30 tablet 1     chlorthalidone (HYGROTON) 25 MG tablet Take 1 tablet (25 mg) by mouth daily 90 tablet 3     albuterol (PROAIR HFA/PROVENTIL HFA/VENTOLIN HFA) 108 (90 BASE) MCG/ACT Inhaler Inhale 2 puffs into the lungs every 6 hours as needed for shortness of breath / dyspnea or wheezing 1 Inhaler 3     tiotropium (SPIRIVA HANDIHALER) 18 MCG capsule Inhale contents of one capsule daily. 30 capsule 1        No Known Allergies          +++++++      Problem, Medication and Allergy Lists were reviewed and updated if needed..    Patient is an established patient of this clinic..         Review of Systems:   General: No distress  HEENT: No sore throat, + tooth pain  Pulm: No shortness of breath, + daily cough  CVS: No chest pain, no palpitations  GI: No abdominal pain, nausea, vomiting or diarrhea   : No dysuria  MSK: No back pain  Skin: No new rashes  Neuro: No headache, no dizziness,               Physical Exam:     Vitals:    03/30/17 0840 03/30/17 0852   BP: (!) 179/92 174/89   BP Location: Left arm    Patient Position: Chair    Cuff Size: Adult Regular    Pulse: 71    Resp: 18    Temp: 97.6  F (36.4  C)    TempSrc:  Oral    SpO2: 98%    Weight: 163 lb 9.6 oz (74.2 kg)      There is no height or weight on file to calculate BMI.    Constitutional: Awake, alert, cooperative, no apparent distress, and appears stated age  HEENT: MMM, no conjunctival pallor, reproducible pain on percussion over lower teeth on patient's left. Multiple visible dental caries throughout teeth with yellow staining  Pulm: CTAB, No rales, No wheeze, no increased effort of breathing.  CVS: RRR, No murmurs   GI: S NT ND BS +ve  Hem/Onc: No cervical LN marisel  MSK: No pedal edema or calf tenderness.  Skin : no  rash  Neuro:  No focal neurological deficit  Psych : Good eye contact, well groomed, very interactive and collaborative. Good insight. Thought process is linear and coherent. Associations are tight. Mood is good. Affect euthymic.         No results found for this or any previous visit (from the past 24 hour(s)).    Assessment and Plan      1. Hypertensive urgency resolved on repeat BP.       Benign essential hypertension    BP goal sbp <140. No evidence of end organ dsyfunction. Increased amlodipine to 10 mg and refilled chlorathalidione today. Follow up in 2-4 weeks for repeat BP check. bp cuff ordered.    - chlorthalidone (HYGROTON) 25 MG tablet; Take 2 tablets (50 mg) by mouth daily  Dispense: 90 tablet; Refill: 3  - amLODIPine (NORVASC) 10 MG tablet; Take 1 tablet (10 mg) by mouth daily  Dispense: 60 tablet; Refill: 3  - order for DME; Equipment being ordered: automated bp cuff  Dispense: 1 Box; Refill: 1  - Basic Metabolic Panel ([facility])    3. At risk for heart disease. ASCVD risk 39 percent today. Started on high intensity statin.   - atorvastatin (LIPITOR) 40 MG tablet; Take 1 tablet (40 mg) by mouth daily  Dispense: 90 tablet; Refill: 3    4. Health care maintenance  - Cholecalciferol (VITAMIN D) 2000 UNITS tablet; Take 2,000 Units by mouth daily  Dispense: 100 tablet; Refill: 3    5. Tooth abscess- will treat with augmentin. Recommended follow  up with dentistry.  - amoxicillin-clavulanate (AUGMENTIN) 875-125 MG per tablet; Take 1 tablet by mouth 2 times daily  Dispense: 20 tablet; Refill: 0    6. Simple chronic bronchitis (H)- no PFT's in chart. Patient is long time smoker with daily coughing. spiriva not covered, alternative sent to pharmacy. Needs PFT's but unable to due to schedule at this time.     - umeclidinium (INCRUSE ELLIPTA) 62.5 MCG/INH oral inhaler; Inhale 1 puff into the lungs daily  Dispense: 1 Inhaler; Refill: 3    There are no discontinued medications.    Options for treatment and follow-up care were reviewed with the patient. Dana Meehan  engaged in the decision making process and verbalized understanding of the options discussed and agreed with the final plan.    Jacquelyn Watson MD G3  [facility]  Family Medicine Resident  Pager [phone]

## 2017-04-06 NOTE — TELEPHONE ENCOUNTER
Prior Authorization: Denied    Denied Reason:  patient must try and fail approved formulary medications     Alternatives: spiriva respimat incruse ellipta      TO APPEAL  - must appeal within 180 days of date of denial letter (10/14/16)  Mail [facility]   Appeals Department,  [address] [address]   [place] [address]    Fax: [phone]    The PA Team has the denial letter with full appeal rights if needed.    Pharmacy notified.  Routing to MD Ting Guzman April 6, 2017 at 3:08 PM

## 2017-04-08 PROBLEM — Z91.89 POOR ORAL HYGIENE: Status: ACTIVE | Noted: 2017-04-08

## 2017-04-08 PROBLEM — J42 CHRONIC BRONCHITIS (H): Status: ACTIVE | Noted: 2017-04-08

## 2017-04-08 PROBLEM — I16.0 HYPERTENSIVE URGENCY: Status: RESOLVED | Noted: 2017-03-27 | Resolved: 2017-04-08

## 2017-04-10 NOTE — PROGRESS NOTES
HPI       Dana Meehan is a 62 year old male who presents to establish care as well as bp control. He states that he has been has been a smoker for 35 years. He currently works as a . No fevers or chills. No PURI. No SOB no orthopnea. He denies chest pain. He is currently not on albuterol but says he has a history of asthma.         No Known Allergies     Problem, Medication and Allergy Lists were reviewed and updated if needed..     Patient is an established patient of this clinic..         Review of Systems:    General: No distress  HEENT: No sore throat  Pulm: No shortness of breath, no cough  CVS: No chest pain, no palpitations  GI: No abdominal pain, nausea, vomiting or diarrhea   : No dysuria  MSK: No back pain  Skin: No new rashes  Neuro: No headache, no dizziness,        Physical Exam:       Vitals        Vitals:     03/22/17 0859   BP: (!) 203/107   BP Location: Left arm   Patient Position: Chair   Cuff Size: Adult Regular   Pulse: 69   Resp: 18   Temp: 97.4  F (36.3  C)   TempSrc: Oral   SpO2: 97%   Weight: 163 lb (73.9 kg)         There is no height or weight on file to calculate BMI.     Constitutional: Awake, alert, cooperative, no apparent distress, and appears stated age  HEENT: MMM, no conjunctival pallor  Pulm: CTAB, No rales, No wheeze, no increased effort of breathing.  CVS: RRR, No murmurs   GI: S NT ND BS +ve  Hem/Onc: No cervical LN marisel  MSK: No pedal edema or calf tenderness.  Skin : no rash  Neuro: No focal neurological deficit  Psych : Good eye contact, well groomed, very interactive and collaborative. Good insight. Thought process is linear and coherent. Associations are tight. Mood is good. Affect euthymic.         EKG Interpretation:  By Jacquelyn Watson MD     Indication:Hypertension  Symptoms at time of EKG: None   Interpretation: appears normal, NSR, normal axis, normal intervals, no acute ST/T changes c/w ischemia, no LVH by voltage criteria, unchanged from  previous tracings  Comparison with previous EKGs:No change from previous     Patient informed at visit.     No results found for this or any previous visit (from the past 24 hour(s)).     Assessment and Plan       1. Hypertensive urgency- labs to rule out end organ failure. EKG without St/t changes. Has stopped bp medication for over 1 week. He does not know what medication he was on and has no idea his bp readings while on it. Will start on two meds today. Follow up within 1 week for BP check and then 2 weeks to check labs.      - Hepatic Panel ([facility])  - Basic Metabolic Panel ([facility])  - EKG 12-lead complete w/read - Clinics  - Lipid [place] ([facility])  - Hemoglobin A1c ([facility])  - amLODIPine (NORVASC) 5 MG tablet; Take 1 tablet (5 mg) by mouth daily Dispense: 30 tablet; Refill: 1  - chlorthalidone (HYGROTON) 25 MG tablet; Take 1 tablet (25 mg) by mouth daily Dispense: 90 tablet; Refill: 3     2. Other emphysema. - per patient he has hx of asthma.  no pfts in chart. Patient not interested in getting any done due to schedule. Will start on Spiriva and albuterol prn for now.   - albuterol (PROAIR HFA/PROVENTIL HFA/VENTOLIN HFA) 108 (90 BASE) MCG/ACT Inhaler; Inhale 2 puffs into the lungs every 6 hours as needed for shortness of breath / dyspnea or wheezing Dispense: 1 Inhaler; Refill: 3  - tiotropium (SPIRIVA HANDIHALER) 18 MCG capsule; Inhale contents of one capsule daily. Dispense: 30 capsule; Refill: 1     There are no discontinued medications.     Options for treatment and follow-up care were reviewed with the patient. Dana Meehan engaged in the decision making process and verbalized understanding of the options discussed and agreed with the final plan.     Jacquelyn Watson MD G3  [facility]  Family Medicine Resident  Pager [phone]

## 2017-04-11 NOTE — PROGRESS NOTES
HPI       Dana Meehan is a 62 year old male who presents to establish care as well as bp control. He states that he has been has been a smoker for 35 years. He currently works as a . No fevers or chills. No PURI. No SOB no orthopnea. He denies chest pain. He is currently not on albuterol but says he has a history of asthma.         No Known Allergies     Problem, Medication and Allergy Lists were reviewed and updated if needed..     Patient is an established patient of this clinic..         Review of Systems:    General: No distress  HEENT: No sore throat  Pulm: No shortness of breath, no cough  CVS: No chest pain, no palpitations  GI: No abdominal pain, nausea, vomiting or diarrhea   : No dysuria  MSK: No back pain  Skin: No new rashes  Neuro: No headache, no dizziness,        Physical Exam:       Vitals        Vitals:     03/22/17 0859   BP: (!) 203/107   BP Location: Left arm   Patient Position: Chair   Cuff Size: Adult Regular   Pulse: 69   Resp: 18   Temp: 97.4  F (36.3  C)   TempSrc: Oral   SpO2: 97%   Weight: 163 lb (73.9 kg)         There is no height or weight on file to calculate BMI.     Constitutional: Awake, alert, cooperative, no apparent distress, and appears stated age  HEENT: MMM, no conjunctival pallor  Pulm: CTAB, No rales, No wheeze, no increased effort of breathing.  CVS: RRR, No murmurs   GI: S NT ND BS +ve  Hem/Onc: No cervical LN marisel  MSK: No pedal edema or calf tenderness.  Skin : no rash  Neuro: No focal neurological deficit  Psych : Good eye contact, well groomed, very interactive and collaborative. Good insight. Thought process is linear and coherent. Associations are tight. Mood is good. Affect euthymic.         EKG Interpretation:  By Jacquelyn Watson MD     Indication:Hypertension  Symptoms at time of EKG: None   Interpretation: appears normal, NSR, normal axis, normal intervals, no acute ST/T changes c/w ischemia, no LVH by voltage criteria, unchanged from  previous tracings  Comparison with previous EKGs:No change from previous     Patient informed at visit.     No results found for this or any previous visit (from the past 24 hour(s)).     Assessment and Plan       1. Hypertensive urgency- labs to rule out end organ failure. EKG without St/t changes. Has stopped bp medication for over 1 week. He does not know what medication he was on and has no idea his bp readings while on it. Will start on two meds today. Follow up within 1 week for BP check and then 2 weeks to check labs.      - Hepatic Panel ([facility])  - Basic Metabolic Panel ([facility])  - EKG 12-lead complete w/read - Clinics  - Lipid Dixie ([facility])  - Hemoglobin A1c ([facility])  - amLODIPine (NORVASC) 5 MG tablet; Take 1 tablet (5 mg) by mouth daily Dispense: 30 tablet; Refill: 1  - chlorthalidone (HYGROTON) 25 MG tablet; Take 1 tablet (25 mg) by mouth daily Dispense: 90 tablet; Refill: 3     2. Other emphysema. - per patient he has hx of asthma.  no pfts in chart. Patient not interested in getting any done due to schedule. Will start on Spiriva and albuterol prn for now.   - albuterol (PROAIR HFA/PROVENTIL HFA/VENTOLIN HFA) 108 (90 BASE) MCG/ACT Inhaler; Inhale 2 puffs into the lungs every 6 hours as needed for shortness of breath / dyspnea or wheezing Dispense: 1 Inhaler; Refill: 3  - tiotropium (SPIRIVA HANDIHALER) 18 MCG capsule; Inhale contents of one capsule daily. Dispense: 30 capsule; Refill: 1     There are no discontinued medications.     Options for treatment and follow-up care were reviewed with the patient. Dana Meehan engaged in the decision making process and verbalized understanding of the options discussed and agreed with the final plan.     Jacquelyn Watson MD G3  [facility]  Family Medicine Resident  Pager [phone]

## 2017-04-12 NOTE — PROGRESS NOTES
Preceptor Attestation:   Patient seen and discussed with the resident. Assessment and plan reviewed with resident and agreed upon.   Supervising Physician:  Armaan Hernandez MD  [facility]

## 2020-03-10 ENCOUNTER — HEALTH MAINTENANCE LETTER (OUTPATIENT)
Age: 65
End: 2020-03-10

## 2020-12-27 ENCOUNTER — HEALTH MAINTENANCE LETTER (OUTPATIENT)
Age: 65
End: 2020-12-27

## 2021-04-24 ENCOUNTER — HEALTH MAINTENANCE LETTER (OUTPATIENT)
Age: 66
End: 2021-04-24

## 2021-10-09 ENCOUNTER — HEALTH MAINTENANCE LETTER (OUTPATIENT)
Age: 66
End: 2021-10-09

## 2022-03-26 ENCOUNTER — HEALTH MAINTENANCE LETTER (OUTPATIENT)
Age: 67
End: 2022-03-26

## 2022-09-11 ENCOUNTER — HEALTH MAINTENANCE LETTER (OUTPATIENT)
Age: 67
End: 2022-09-11

## 2023-05-06 ENCOUNTER — HEALTH MAINTENANCE LETTER (OUTPATIENT)
Age: 68
End: 2023-05-06